# Patient Record
Sex: FEMALE | Race: BLACK OR AFRICAN AMERICAN | NOT HISPANIC OR LATINO | Employment: UNEMPLOYED | URBAN - METROPOLITAN AREA
[De-identification: names, ages, dates, MRNs, and addresses within clinical notes are randomized per-mention and may not be internally consistent; named-entity substitution may affect disease eponyms.]

---

## 2018-03-22 ENCOUNTER — HOSPITAL ENCOUNTER (EMERGENCY)
Facility: HOSPITAL | Age: 23
Discharge: HOME | End: 2018-03-23
Attending: EMERGENCY MEDICINE
Payer: MEDICAID

## 2018-03-22 DIAGNOSIS — O20.0 THREATENED ABORTION IN FIRST TRIMESTER: Primary | ICD-10-CM

## 2018-03-22 LAB
HCG INTACT+B SERPL-ACNC: NORMAL MIU/ML
HCT VFR BLD AUTO: 34.3 %

## 2018-03-22 PROCEDURE — 84702 CHORIONIC GONADOTROPIN TEST: CPT

## 2018-03-22 PROCEDURE — 86901 BLOOD TYPING SEROLOGIC RH(D): CPT

## 2018-03-22 PROCEDURE — 99284 EMERGENCY DEPT VISIT MOD MDM: CPT

## 2018-03-22 PROCEDURE — 85014 HEMATOCRIT: CPT

## 2018-03-23 VITALS
DIASTOLIC BLOOD PRESSURE: 71 MMHG | TEMPERATURE: 99 F | RESPIRATION RATE: 18 BRPM | OXYGEN SATURATION: 100 % | SYSTOLIC BLOOD PRESSURE: 105 MMHG | WEIGHT: 141 LBS | HEART RATE: 75 BPM

## 2018-03-23 LAB — ABO + RH BLD: NORMAL

## 2018-03-23 NOTE — ED NOTES
Patient reports having a quick gush of blood today after having back pain. She reports bleeding has decreased since the original gush. She reports a pad worth of bleeding. She reports continued back pain. Patient reports feeling worried over her current situation.  abd soft and non tender. BBSCTA. Skin warm and dry resp even and unlabored.  Will continue to monitor.

## 2018-03-23 NOTE — ED NOTES
Patient moved to procedure room 1 for pelvic exam. Candace edmonds at bedside to assist Dr. Julian.

## 2018-03-23 NOTE — ED PROVIDER NOTES
"Encounter Date: 3/22/2018       History     Chief Complaint   Patient presents with    Vaginal Bleeding     Pt states "I'm pregnant and I started bleeding today".     Patient is a  who currently presents with concerns regarding vaginal bleeding in pregnancy.  Patient identifies her last menstrual cycle as being January 15 placing her at approximately 9w4d EGA.  She describes that she saturated a pad at first but that this had improved.  Denies abdominal pain/cramping.  Patient has not yet seen her OB.              Review of patient's allergies indicates:  No Known Allergies  History reviewed. No pertinent past medical history.  History reviewed. No pertinent surgical history.  History reviewed. No pertinent family history.  Social History   Substance Use Topics    Smoking status: Never Smoker    Smokeless tobacco: Not on file    Alcohol use No     Review of Systems   Constitutional: Negative for chills and fever.   HENT: Negative for congestion and rhinorrhea.    Respiratory: Negative for cough, chest tightness, shortness of breath and wheezing.    Cardiovascular: Negative for chest pain, palpitations and leg swelling.   Gastrointestinal: Negative for abdominal pain, constipation, diarrhea, nausea and vomiting.   Genitourinary: Positive for vaginal bleeding. Negative for dysuria, frequency, urgency and vaginal discharge.   Skin: Negative for color change and rash.   Allergic/Immunologic: Negative for immunocompromised state.   Neurological: Negative for dizziness, weakness and numbness.   Hematological: Negative for adenopathy. Does not bruise/bleed easily.   All other systems reviewed and are negative.      Physical Exam     Initial Vitals [18 2135]   BP Pulse Resp Temp SpO2   119/85 78 16 98.7 °F (37.1 °C) 100 %      MAP       96.33         Physical Exam    Nursing note and vitals reviewed.  Constitutional: She appears well-developed and well-nourished. She is not diaphoretic. No distress.   HENT: "   Head: Normocephalic and atraumatic.   Right Ear: External ear normal.   Left Ear: External ear normal.   Nose: Nose normal.   Mouth/Throat: Oropharynx is clear and moist.   Eyes: Conjunctivae and EOM are normal. Pupils are equal, round, and reactive to light. No scleral icterus.   Neck: Neck supple. No tracheal deviation present. No JVD present.   Cardiovascular: Normal rate, regular rhythm, normal heart sounds and intact distal pulses. Exam reveals no gallop and no friction rub.    No murmur heard.  Pulmonary/Chest: Breath sounds normal. No respiratory distress. She has no wheezes. She has no rhonchi. She has no rales.   Abdominal: Soft. Bowel sounds are normal. She exhibits no distension. There is no tenderness.   Genitourinary: Vagina normal and uterus normal. Pelvic exam was performed with patient supine. There is no rash, tenderness, lesion or injury on the right labia. There is no rash, tenderness, lesion or injury on the left labia. Cervix exhibits no motion tenderness, no discharge and no friability. Right adnexum displays no mass, no tenderness and no fullness. Left adnexum displays no mass, no tenderness and no fullness. No erythema, tenderness or bleeding in the vagina. No foreign body in the vagina. No signs of injury around the vagina. No vaginal discharge found.   Genitourinary Comments: ER Tech at bedside for chaperone and assistance.  Cervix closed.   Musculoskeletal: Normal range of motion. She exhibits no edema.   Neurological: She is alert and oriented to person, place, and time.   Skin: Skin is warm and dry. No rash noted.   Psychiatric: She has a normal mood and affect. Her behavior is normal.         ED Course   Procedures  Labs Reviewed   HEMATOCRIT - Abnormal; Notable for the following:        Result Value    Hematocrit 34.3 (*)     All other components within normal limits   HCG, QUANTITATIVE, PREGNANCY   GROUP & RH     Imaging Results          US OB Less Than 14 Wks First Gestation (Final  result)  Result time 18 22:56:25    Final result by Wili Dawn MD (18 22:56:25)                 Impression:      1.  Single living intrauterine pregnancy with ultrasound gestational age of 9 weeks and 4 days, and estimated confinement of 10/21/2018.  This is 1 day more than the last menstrual period dated.  2.  Normal ovaries and adnexal regions.      Electronically signed by: WILI DAWN MD  Date:     18  Time:    22:56              Narrative:    First trimester OB ultrasound    Clinical indication: 1st trimester pregnancy with bleeding    Findings: No comparison studies are available. Beta-hCG level is not available at the time of imaging.  The uterus measures 8.8 x 7.3 x 6.4 cm.  There is a gestational sac with good decidual reaction surrounding.  There is a fetal pole and a yolk sac noted, with fetal heart rate of 173 beats/min. Based on a crown-rump length of 27.4 millimeters (9 weeks and 4 days), and a mean sac diameter of  41.4 mm (9 weeks and 4 days), current ultrasound gestational age is 9 weeks and 4 days with estimated date of confinement of 10/21/2018.  Based on a last menstrual period of 01/15/2018, gestational age is 9 weeks and 3 days with estimated date of confinement of 10/22/2018.    The right ovary measures 3.3 x 2.1 x 1.8 cm, and the left ovary measures 3.8 x 2.6 x 2.2 cm.    Negative for adnexal masses.  Negative for free fluid within the cul-de-sac.    Visualized portions of the cervix, vagina and urinary bladder are normal.    No definite evidence for subchorionic hemorrhage is seen.                                    Medical Decision Making:   ED Management:  All findings were reviewed with the patient/family in detail along with the diagnosis of threatened .  I see no indication of an emergent process beyond that addressed during our encounter but have duly counseled the patient/family regarding the need for prompt follow-up as well as the indications that  should prompt immediate return to the emergency room should new or worrisome developments occur.  The patient/family communicates understanding of all this information and all remaining questions and concerns were addressed at this time.                          Clinical Impression:   The encounter diagnosis was Threatened  in first trimester.                           Levi Julian MD  [id number]